# Patient Record
Sex: MALE | Race: WHITE
[De-identification: names, ages, dates, MRNs, and addresses within clinical notes are randomized per-mention and may not be internally consistent; named-entity substitution may affect disease eponyms.]

---

## 2019-02-28 ENCOUNTER — HOSPITAL ENCOUNTER (EMERGENCY)
Dept: HOSPITAL 88 - FSED | Age: 34
Discharge: HOME | End: 2019-02-28
Payer: COMMERCIAL

## 2019-02-28 VITALS — BODY MASS INDEX: 28.03 KG/M2 | WEIGHT: 189.25 LBS | HEIGHT: 69 IN

## 2019-02-28 VITALS — SYSTOLIC BLOOD PRESSURE: 160 MMHG | DIASTOLIC BLOOD PRESSURE: 90 MMHG

## 2019-02-28 DIAGNOSIS — N20.0: ICD-10-CM

## 2019-02-28 DIAGNOSIS — R10.11: ICD-10-CM

## 2019-02-28 DIAGNOSIS — M54.5: Primary | ICD-10-CM

## 2019-02-28 PROCEDURE — 80053 COMPREHEN METABOLIC PANEL: CPT

## 2019-02-28 PROCEDURE — 74176 CT ABD & PELVIS W/O CONTRAST: CPT

## 2019-02-28 PROCEDURE — 99284 EMERGENCY DEPT VISIT MOD MDM: CPT

## 2019-02-28 PROCEDURE — 85025 COMPLETE CBC W/AUTO DIFF WBC: CPT

## 2019-02-28 NOTE — DIAGNOSTIC IMAGING REPORT
EXAM: CT Abdomen and Pelvis WITHOUT contrast  

INDICATION: Right flank pain  

^[id number]

^1730    



COMPARISON: None.

TECHNIQUE: Abdomen and pelvis were scanned utilizing a multidetector helical

scanner from the lung base to the pubic symphysis without administration of IV

contrast. Absence of intravenous contrast decreases sensitivity for detection

of focal lesions and vascular pathology. Coronal and sagittal reformations were

obtained. Routine protocol was performed. 

            IV CONTRAST: None.

            ORAL CONTRAST: Water

            RADIATION DOSE: Total DLP: 784.2 mGy*cm

             Estimated effective dose: (DLP x 0.015 x size factor) mSv

            COMPLICATIONS: None



FINDINGS:



LINES and TUBES: None.



LOWER THORAX:  Unremarkable



HEPATOBILIARY:      No focal hepatic lesions. No biliary ductal dilation. 



GALLBLADDER: No radio-opaque stones or sludge.  No wall thickening.



SPLEEN: No splenomegaly. 



PANCREAS: No focal masses or ductal dilatation.  



ADRENALS: No adrenal nodules    



KIDNEYS/URETERS: No cystic or solid mass lesions.  A 4 mm calcified stone is

seen in the proximal right ureter, 4.6 cm below the ureteropelvic junction,

with associated mild to moderate hydronephrosis and dilatation of the proximal

ureter. There is mild fat stranding surrounding the right kidney and proximal

ureter suggestive of inflammatory changes but cannot exclude superimposed

infection. There are additional 2 calcified stones in the right kidney in the

mid pole (4 mm) and inferior pole (5 mm).



A 3 mm calcified stone in the inferior pole of the left kidney is noted without

hydronephrosis. The left ureter appears unremarkable.



GI TRACT: No abnormal distention, wall thickening, or evidence of bowel

obstruction.       Appendix is normal.



PELVIC ORGANS/BLADDER: Unremarkable.



LYMPH NODES: No lymphadenopathy.



VESSELS: Unremarkable.



PERITONEUM / RETROPERITONEUM: No free air or fluid.



BONES: Unremarkable.



SOFT TISSUES: Unremarkable.            



IMPRESSION: 

1.  Mild to moderately obstructed proximal right ureteral stone (4 mm). Few

additional up to 5 mm right renal stones. Perinephric fat stranding and

surrounding the proximal ureter likely inflammatory changes but cannot exclude

infection.



2.  Single nonobstructing left nephrolithiasis.



Signed by: Dr. Komal Jorge M.D. on 2/28/2019 6:07 PM

## 2019-02-28 NOTE — XMS REPORT
Encounter Summary

                             Created on: 2019



Cristian Avilez

External Reference #: [id number]

: 1985

Sex: Male



Demographics







                          Address                   [address] [place]  [address]

 

                          Home Phone                [phone]

 

                          Preferred Language        Unknown

 

                          Marital Status            Never 

 

                          Jainism Affiliation     Unknown

 

                          Race                      Unknown

 

                          Ethnic Group              Unknown





Author







                          Organization              Unknown

 

                          Address                   [address] [place]  [address]



 

                          Phone                     [phone]



                                                      



Reason for Visit

          





                                        Medical Complaint



                                                                    



Instructions

          





                                        1. Exposure to Influenzavirus

 

                                         rapid flu (A+B)

 

                                         Tamiflu 75 mg capsule

 

                                         Tessalon Perles 100 mg capsule

 

                                         Lidocaine Viscous 2 % mucosal solution

 

                                         influenza (flu): care instructions

 

                                        2. Immunization due

 

                                        3. Elevated blood-pressure reading without diagnosis of hypertension

 

                                         elevated blood pressure: care instructions

 

                                        4. Body mass index 25-29 - overweight

 

                                         learning about healthy weight









                                        Discussion Note

 

                                        Take charge of your health handout given and discussed. SE of medictions discussed

 and pt verbalized understanding.



                                                                                
                           



Plan of Care

          





Patient Instructions





                                        ***How can you care for yourself at home? 

Get plenty of rest. 

 

Drink plenty of fluids, enough so that your urine is light yellow or clear like 
water. If you have kidney, heart, or liver disease and have to limit fluids, 
talk with your doctor before you increase the amount of fluids you drink. 

 

Take an over-the-counter pain medicine if needed, such as acetaminophen 
(Tylenol), ibuprofen (Advil, Motrin), or naproxen (Aleve), to relieve fever, 
headache, and muscle aches. Read and follow all instructions on the label. No 
one younger than 20 should take aspirin. It has been linked to Reye syndrome, a 
serious illness. 

 

Do not smoke. Smoking can make the flu worse. If you need help quitting, talk to
your doctor about stop-smoking programs and medicines. These can increase your 
chances of quitting for good. 

 

Breathe moist air from a hot shower or from a sink filled with hot water to help
clear a stuffy nose. 

 

Before you use cough and cold medicines, check the label. These medicines may 
not be safe for young children or for people with certain health problems. 

 

If the skin around your nose and lips becomes sore, put some petroleum jelly on 
the area. 

 

 

 

***To ease coughing: 

 

Drink fluids to soothe a scratchy throat. 

 

Suck on cough drops or plain hard candy. 

 

Take an over-the-counter cough medicine that contains dextromethorphan to help 
you get some sleep. Read and follow all instructions on the label. 

 

Raise your head at night with an extra pillow. This may help you rest if 
coughing keeps you awake. 

 

Take any prescribed medicine exactly as directed. Call your doctor if you think 
you are having a problem with your medicine. 

 

 

 

***To avoid spreading the flu 

 

Wash your hands regularly, and keep your hands away from your face. 

 

Stay home from school, work, and other public places until you are feeling 
better and your fever has been gone for at least 24 hours. The fever needs to 
have gone away on its own without the help of medicine. 

 

Ask people living with you to talk to their doctors about preventing the flu. 
They may get antiviral medicine to keep from getting the flu from you. 

 

To prevent the flu in the future, get a flu vaccine every fall. Encourage people
living with you to get the vaccine. 

 

Cover your mouth when you cough or sneeze. 

 

 

 

***When should you call for help? 

 

Call 911 anytime you think you may need emergency care. For example, call if: 

 

You have severe trouble breathing. 

 

 

 

***Call your doctor now or seek immediate medical care if: 

 

You have new or worse trouble breathing. 

 

You seem to be getting much sicker. 

 

You feel very sleepy or confused. 

 

You have a new or higher fever. 

 

You get a new rash. 

 

 

 

***Watch closely for changes in your health, and be sure to contact your doctor 
if: 

 

You begin to get better and then get worse. 

 

You are not getting better after 1 week.











                Reminders                                       Provider

 

                Appointments    None recorded.                   

 

                Lab             Rapid Flu (A+B)    2019      [facility]

 

                Referral        None recorded.                   

 

                Procedures      None recorded.                   

 

                Surgeries       None recorded.                   

 

                Imaging         None recorded.                   



                                                                                
       



Medications

                      





                    Name                      Start Date                                      

 

                                        Lidocaine Viscous 2 % mucosal solution

 Take 15 mL every 3 hours by oral route.

 Swish gargle & spit.                   

 

                                        Tamiflu 75 mg capsule

 Take 1 capsule twice a day by oral route for 5 days.    

 

                                        Tessalon Perles 100 mg capsule

 Take 1 capsule 3 times a day by oral route for 7 days.    



                                                                                
                           



Medications Administered

          



  None recorded.                                                                
               



Vitals

          





                Height          Weight          BMI             Blood Pressure 

 

                 5 ft 10 in        175 lbs         25.1 kg/m2        (1) 160/100 mm[Hg]

                                        (2) 138/84 mm[Hg]  



                                                                              



Lab Results

                      



              None recorded.                                                    
                                                



Allergies

          





          Code      Code System    Name      Reaction    Severity    Status    Onset

 

             NKDA                                             



                                                                              



Problems

          





                                        No Known Problems



                                                                              



Procedures

          



None recorded.                                                                  
  



Vaccine List

          





                                        Vaccine Type

 

                                        Tdap

 

                                        10/31/2017



                                                                              



Social History

          





                    Smoking Status      Former Smoker        



                                                                              



Past Encounters

                      





                                        2019

Exposure to Influenzavirus; Immunization Due; Elevated Blood-pressure Reading 
without Diagnosis of Hypertension; Body Mass Index 25-29 - Overweight

Emanuel Jefferson FNP-C: [address] [facility] [place] [address], Ph. [phone]



                                                                                
        



History of Present Illness

          





                                                   Illness-Fever-Flu

 

                          Reported By:              Patient

 

                          HPI:                      Quality: cannot identify. Duration: constant, 1 days. Severity: subjective

 temperature. Context: no tick/insect bites, no recent travel, no new 
medications, ill contacts. Associated Symptoms: no headache, no rash, no 
lethargy, fever/chills, muscle aches, tired (fatigue), cough







Review of Systems:ROS as noted in the HPI                                       
                             



Review of Systems

                      





                                                   Basic

 

                          Reported By:              Patient



                                                                              



Physical Exam

                      





                                                   Adult Basic, Adult Male Complete

 

                          Reported By:              Patient

 

                          Constitutional:           General Appearance: healthy-appearing, well-nourished, well-developed.

 Level of Distress: mild distress, acutely ill. Ambulation: ambulating normally

 

                          Psychiatric:              Mental Status: active and alert. Orientation: to time, to place, to

 person

 

                          Eyes:                     Lids and Conjunctivae: non-injected, no discharge, no pallor

 

                          Ear-Nose-Mouth-Throat:    Ears: no lesions on external ear, no outer ear tenderness,

 EACs clear, TMs clear, TM mobility normal. Hearing: no hearing loss. Nose: no 
lesions on external nose, nares patent, no septal deviation, nasal passages 
clear, no sinus tenderness, no nasal discharge. Lips, Teeth, and Gums: no mouth 
or lip ulcers, no bleeding gums, normal dentition. Oropharynx: moist mucous 
membranes, no erythema, no exudates, tonsils not enlarged

 

                          Neck:                     Lymph Nodes: no cervical LAD

 

                          Lungs:                    Respiratory effort: no dyspnea, no tachypnea, no use of accessory muscles,

 no intercostal retractions. Auscultation: breath sounds normal, good air 
movement

 

                          Cardiovascular:           Heart Auscultation: RRR, no murmurs

 

                          Neurologic:               Gait and Station: normal gait

 

                          Skin:                     Inspection and palpation: no rash; flushed cheeks

## 2019-02-28 NOTE — XMS REPORT
Continuity of Care Document

                             Created on: 2019



NEGRO GLOVER

External Reference #: [id number]

: 1985

Sex: Male



Demographics







                          Address                   [address] [place]  [address]

 

                          Home Phone                [phone]

 

                          Preferred Language        Unknown

 

                          Marital Status            Unknown

 

                          Tenriism Affiliation     Unknown

 

                          Race                      Unknown

 

                          Ethnic Group              Unknown





Author







                          Author                    [facility]              Interface

 

                          Address                   Unknown

 

                          Phone                     Unavailable



                                                    



Problems

                    





                    Problem                            Status                            Onset Date     

                          Classification                            Date Reported       

                          Comments                            Source                    

 

                          Body mass index 25-29 - overweight                                                

                    2019                            Diagnosis                            2019

                                                        [facility]                   

 

 

                                        Elevated blood-pressure reading without diagnosis of hypertension               

                                                2019                            Diagnosis

                            2019                                               

                                        [facility]                    

 

                    Immunization due                                                        2019  

                          Diagnosis                            2019            

                                                      [facility]                    

 

                    Exposure to Influenzavirus                                                        2019

                            Diagnosis                            2019          

                                                      [facility]                    



                                                                                
                                                       



Medications

                    





                    Medication                            Details                            Route      

                          Status                            Patient Instructions         

                          Ordering Provider                            Order Date           

                                        Source                    

 

                                        Lidocaine Hydrochloride 20 MG/ML Mucous Membrane Topical Solution               

                                        Lidocaine Viscous 2 % mucosal solution Take 15 mL every 3 hours by oral

 route. Swish gargle & spit.                                                        Active

                                                                                       

                                        [facility]                    

 

                          Oseltamivir 75 MG Oral Capsule [Tamiflu]                            Tamiflu 75 mg 

capsule Take 1 capsule twice a day by oral route for 5 days.                    
                                                Active                                             

                                                                            [facility]          

          

 

                          benzonatate 100 MG Oral Capsule [Tessalon Perles]                            Tessalon

 Perles 100 mg capsule Take 1 capsule 3 times a day by oral route for 7 days.   
                                                    Active                             

                                                                                   [facility]

                    



                                                                                
                                       



Allergies, Adverse Reactions, Alerts

                    





                    Substance                            Category                            Reaction   

                          Severity                            Reaction type           

                          Status                            Date Reported                     

                          Comments                            Source                    



                                                                



Immunizations

                    





                    Immunization                            Date Given                            Site  

                          Status                            Last Updated             

                          Comments                            Source                    

 

                    Tdap                            10/31/2017                                          

                    completed                                                                

                                        [facility]                    



                                                                                
       



Results

                    





                    Order Name                            Results                            Value      

                          Reference Range                            Date                

                          Interpretation                            Comments                       

                                        Source                    



                                                                                



Vital Signs

                    





                    Vital Sign                            Value                            Date         

                          Comments                            Source                    

 

                    Diastolic (mm Hg)                            84                             2019

                                                        [facility]                   

 

 

                    Height                            70                             2019         

                                                      [facility]                    

 

                    Systolic (mm Hg)                            138                             2019

                                                        [facility]                   

 

 

                    Weight                            175                             2019        

                                                      [facility]                    



                                                                                
                                                       



Encounters

                    





                    Location                            Location Details                            Encounter

 Type                            Encounter Number                            Reason For

 Visit                            Attending Provider                            ADM Date

                            DC Date                            Status                

                                        Source                    

 

                          [place] - [facility] - [id number]: [address] [place] [address], Ph. [phone]                               [id number]          

                                                      Emanuel Jefferson                          

                    2019                                                                          

                                        [facility]                    



                                                                                
   



Procedures

                    





                    Procedure                            Code                            Date           

                          Perfomer                            Comments                        

                                        Source